# Patient Record
Sex: FEMALE | Race: WHITE | NOT HISPANIC OR LATINO | Employment: UNEMPLOYED | URBAN - METROPOLITAN AREA
[De-identification: names, ages, dates, MRNs, and addresses within clinical notes are randomized per-mention and may not be internally consistent; named-entity substitution may affect disease eponyms.]

---

## 2017-09-07 DIAGNOSIS — R94.31 EKG ABNORMALITIES: ICD-10-CM

## 2017-09-07 LAB — EKG IMPRESSION: NORMAL

## 2019-06-20 ENCOUNTER — HOSPITAL ENCOUNTER (OUTPATIENT)
Dept: RADIOLOGY | Facility: MEDICAL CENTER | Age: 34
End: 2019-06-20

## 2019-06-20 ENCOUNTER — ANESTHESIA (OUTPATIENT)
Dept: SURGERY | Facility: MEDICAL CENTER | Age: 34
End: 2019-06-20
Payer: COMMERCIAL

## 2019-06-20 ENCOUNTER — APPOINTMENT (OUTPATIENT)
Dept: RADIOLOGY | Facility: MEDICAL CENTER | Age: 34
End: 2019-06-20
Attending: EMERGENCY MEDICINE
Payer: COMMERCIAL

## 2019-06-20 ENCOUNTER — APPOINTMENT (OUTPATIENT)
Dept: RADIOLOGY | Facility: MEDICAL CENTER | Age: 34
End: 2019-06-20
Attending: HOSPITALIST
Payer: COMMERCIAL

## 2019-06-20 ENCOUNTER — HOSPITAL ENCOUNTER (OUTPATIENT)
Facility: MEDICAL CENTER | Age: 34
End: 2019-06-22
Attending: EMERGENCY MEDICINE | Admitting: HOSPITALIST
Payer: COMMERCIAL

## 2019-06-20 ENCOUNTER — ANESTHESIA EVENT (OUTPATIENT)
Dept: SURGERY | Facility: MEDICAL CENTER | Age: 34
End: 2019-06-20
Payer: COMMERCIAL

## 2019-06-20 DIAGNOSIS — R79.89 ELEVATED LFTS: ICD-10-CM

## 2019-06-20 DIAGNOSIS — K92.1 MELENA: ICD-10-CM

## 2019-06-20 DIAGNOSIS — K81.0 CHOLECYSTITIS, ACUTE: ICD-10-CM

## 2019-06-20 DIAGNOSIS — D64.9 ANEMIA, UNSPECIFIED TYPE: ICD-10-CM

## 2019-06-20 DIAGNOSIS — R10.10 PAIN OF UPPER ABDOMEN: ICD-10-CM

## 2019-06-20 PROBLEM — R10.9 AP (ABDOMINAL PAIN): Status: ACTIVE | Noted: 2019-06-20

## 2019-06-20 PROBLEM — Z72.0 TOBACCO ABUSE: Status: ACTIVE | Noted: 2019-06-20

## 2019-06-20 PROBLEM — R74.01 TRANSAMINITIS: Status: ACTIVE | Noted: 2019-06-20

## 2019-06-20 LAB
ALBUMIN SERPL BCP-MCNC: 3.9 G/DL (ref 3.2–4.9)
ALBUMIN/GLOB SERPL: 1.4 G/DL
ALP SERPL-CCNC: 111 U/L (ref 30–99)
ALT SERPL-CCNC: 146 U/L (ref 2–50)
ANION GAP SERPL CALC-SCNC: 6 MMOL/L (ref 0–11.9)
AST SERPL-CCNC: 195 U/L (ref 12–45)
BASOPHILS # BLD AUTO: 0.2 % (ref 0–1.8)
BASOPHILS # BLD: 0.01 K/UL (ref 0–0.12)
BILIRUB SERPL-MCNC: 0.4 MG/DL (ref 0.1–1.5)
BUN SERPL-MCNC: 8 MG/DL (ref 8–22)
CALCIUM SERPL-MCNC: 8.9 MG/DL (ref 8.5–10.5)
CHLORIDE SERPL-SCNC: 108 MMOL/L (ref 96–112)
CO2 SERPL-SCNC: 25 MMOL/L (ref 20–33)
COMMENT 1642: NORMAL
CREAT SERPL-MCNC: 0.89 MG/DL (ref 0.5–1.4)
EOSINOPHIL # BLD AUTO: 0.11 K/UL (ref 0–0.51)
EOSINOPHIL NFR BLD: 2.6 % (ref 0–6.9)
ERYTHROCYTE [DISTWIDTH] IN BLOOD BY AUTOMATED COUNT: 44.1 FL (ref 35.9–50)
GLOBULIN SER CALC-MCNC: 2.8 G/DL (ref 1.9–3.5)
GLUCOSE SERPL-MCNC: 110 MG/DL (ref 65–99)
HAV IGM SERPL QL IA: NEGATIVE
HBV CORE IGM SER QL: NEGATIVE
HBV SURFACE AG SER QL: NEGATIVE
HCG SERPL QL: NEGATIVE
HCG UR QL: NEGATIVE
HCT VFR BLD AUTO: 35.7 % (ref 37–47)
HCV AB SER QL: NEGATIVE
HETEROPH AB SER QL: NEGATIVE
HGB BLD-MCNC: 11.5 G/DL (ref 12–16)
IMM GRANULOCYTES # BLD AUTO: 0.01 K/UL (ref 0–0.11)
IMM GRANULOCYTES NFR BLD AUTO: 0.2 % (ref 0–0.9)
LIPASE SERPL-CCNC: 12 U/L (ref 11–82)
LYMPHOCYTES # BLD AUTO: 1.43 K/UL (ref 1–4.8)
LYMPHOCYTES NFR BLD: 34 % (ref 22–41)
MCH RBC QN AUTO: 29.5 PG (ref 27–33)
MCHC RBC AUTO-ENTMCNC: 32.2 G/DL (ref 33.6–35)
MCV RBC AUTO: 91.5 FL (ref 81.4–97.8)
MONOCYTES # BLD AUTO: 0.3 K/UL (ref 0–0.85)
MONOCYTES NFR BLD AUTO: 7.1 % (ref 0–13.4)
MORPHOLOGY BLD-IMP: NORMAL
NEUTROPHILS # BLD AUTO: 2.34 K/UL (ref 2–7.15)
NEUTROPHILS NFR BLD: 55.9 % (ref 44–72)
NRBC # BLD AUTO: 0 K/UL
NRBC BLD-RTO: 0 /100 WBC
PLATELET # BLD AUTO: 236 K/UL (ref 164–446)
PLATELET BLD QL SMEAR: NORMAL
PMV BLD AUTO: 11.1 FL (ref 9–12.9)
POTASSIUM SERPL-SCNC: 3.7 MMOL/L (ref 3.6–5.5)
PROT SERPL-MCNC: 6.7 G/DL (ref 6–8.2)
RBC # BLD AUTO: 3.9 M/UL (ref 4.2–5.4)
RBC BLD AUTO: NORMAL
SODIUM SERPL-SCNC: 139 MMOL/L (ref 135–145)
SP GR UR REFRACTOMETRY: 1.02
WBC # BLD AUTO: 4.2 K/UL (ref 4.8–10.8)

## 2019-06-20 PROCEDURE — 500002 HCHG ADHESIVE, DERMABOND: Performed by: SURGERY

## 2019-06-20 PROCEDURE — 84703 CHORIONIC GONADOTROPIN ASSAY: CPT

## 2019-06-20 PROCEDURE — 700111 HCHG RX REV CODE 636 W/ 250 OVERRIDE (IP): Performed by: HOSPITALIST

## 2019-06-20 PROCEDURE — 503366 HCHG TROCAR, 12X150 KII FIOS Z THR: Performed by: SURGERY

## 2019-06-20 PROCEDURE — 85025 COMPLETE CBC W/AUTO DIFF WBC: CPT

## 2019-06-20 PROCEDURE — A9270 NON-COVERED ITEM OR SERVICE: HCPCS | Performed by: STUDENT IN AN ORGANIZED HEALTH CARE EDUCATION/TRAINING PROGRAM

## 2019-06-20 PROCEDURE — 700111 HCHG RX REV CODE 636 W/ 250 OVERRIDE (IP): Performed by: STUDENT IN AN ORGANIZED HEALTH CARE EDUCATION/TRAINING PROGRAM

## 2019-06-20 PROCEDURE — 80053 COMPREHEN METABOLIC PANEL: CPT

## 2019-06-20 PROCEDURE — 700101 HCHG RX REV CODE 250: Performed by: STUDENT IN AN ORGANIZED HEALTH CARE EDUCATION/TRAINING PROGRAM

## 2019-06-20 PROCEDURE — 700102 HCHG RX REV CODE 250 W/ 637 OVERRIDE(OP): Performed by: HOSPITALIST

## 2019-06-20 PROCEDURE — 80074 ACUTE HEPATITIS PANEL: CPT

## 2019-06-20 PROCEDURE — 160036 HCHG PACU - EA ADDL 30 MINS PHASE I: Performed by: SURGERY

## 2019-06-20 PROCEDURE — 160048 HCHG OR STATISTICAL LEVEL 1-5: Performed by: SURGERY

## 2019-06-20 PROCEDURE — 700105 HCHG RX REV CODE 258: Performed by: STUDENT IN AN ORGANIZED HEALTH CARE EDUCATION/TRAINING PROGRAM

## 2019-06-20 PROCEDURE — 160002 HCHG RECOVERY MINUTES (STAT): Performed by: SURGERY

## 2019-06-20 PROCEDURE — 86308 HETEROPHILE ANTIBODY SCREEN: CPT

## 2019-06-20 PROCEDURE — 502240 HCHG MISC OR SUPPLY RC 0272: Performed by: SURGERY

## 2019-06-20 PROCEDURE — 87529 HSV DNA AMP PROBE: CPT

## 2019-06-20 PROCEDURE — 88304 TISSUE EXAM BY PATHOLOGIST: CPT

## 2019-06-20 PROCEDURE — 160028 HCHG SURGERY MINUTES - 1ST 30 MINS LEVEL 3: Performed by: SURGERY

## 2019-06-20 PROCEDURE — 700117 HCHG RX CONTRAST REV CODE 255: Performed by: EMERGENCY MEDICINE

## 2019-06-20 PROCEDURE — 99285 EMERGENCY DEPT VISIT HI MDM: CPT

## 2019-06-20 PROCEDURE — 160039 HCHG SURGERY MINUTES - EA ADDL 1 MIN LEVEL 3: Performed by: SURGERY

## 2019-06-20 PROCEDURE — 99406 BEHAV CHNG SMOKING 3-10 MIN: CPT | Performed by: HOSPITALIST

## 2019-06-20 PROCEDURE — 83690 ASSAY OF LIPASE: CPT

## 2019-06-20 PROCEDURE — 502571 HCHG PACK, LAP CHOLE: Performed by: SURGERY

## 2019-06-20 PROCEDURE — 96374 THER/PROPH/DIAG INJ IV PUSH: CPT

## 2019-06-20 PROCEDURE — 99358 PROLONG SERVICE W/O CONTACT: CPT | Mod: 25 | Performed by: HOSPITALIST

## 2019-06-20 PROCEDURE — 700105 HCHG RX REV CODE 258: Performed by: HOSPITALIST

## 2019-06-20 PROCEDURE — 501838 HCHG SUTURE GENERAL: Performed by: SURGERY

## 2019-06-20 PROCEDURE — 700102 HCHG RX REV CODE 250 W/ 637 OVERRIDE(OP): Performed by: STUDENT IN AN ORGANIZED HEALTH CARE EDUCATION/TRAINING PROGRAM

## 2019-06-20 PROCEDURE — 160009 HCHG ANES TIME/MIN: Performed by: SURGERY

## 2019-06-20 PROCEDURE — G0378 HOSPITAL OBSERVATION PER HR: HCPCS

## 2019-06-20 PROCEDURE — A9270 NON-COVERED ITEM OR SERVICE: HCPCS | Performed by: HOSPITALIST

## 2019-06-20 PROCEDURE — 74177 CT ABD & PELVIS W/CONTRAST: CPT

## 2019-06-20 PROCEDURE — 81025 URINE PREGNANCY TEST: CPT

## 2019-06-20 PROCEDURE — 160035 HCHG PACU - 1ST 60 MINS PHASE I: Performed by: SURGERY

## 2019-06-20 PROCEDURE — 99223 1ST HOSP IP/OBS HIGH 75: CPT | Mod: 25 | Performed by: HOSPITALIST

## 2019-06-20 PROCEDURE — 76705 ECHO EXAM OF ABDOMEN: CPT

## 2019-06-20 PROCEDURE — A9537 TC99M MEBROFENIN: HCPCS

## 2019-06-20 RX ORDER — CEFAZOLIN SODIUM 1 G/3ML
INJECTION, POWDER, FOR SOLUTION INTRAMUSCULAR; INTRAVENOUS PRN
Status: DISCONTINUED | OUTPATIENT
Start: 2019-06-20 | End: 2019-06-20 | Stop reason: SURG

## 2019-06-20 RX ORDER — HYDROMORPHONE HYDROCHLORIDE 1 MG/ML
0.1 INJECTION, SOLUTION INTRAMUSCULAR; INTRAVENOUS; SUBCUTANEOUS
Status: DISCONTINUED | OUTPATIENT
Start: 2019-06-20 | End: 2019-06-20 | Stop reason: HOSPADM

## 2019-06-20 RX ORDER — OMEPRAZOLE 20 MG/1
20 CAPSULE, DELAYED RELEASE ORAL 2 TIMES DAILY
Status: DISCONTINUED | OUTPATIENT
Start: 2019-06-20 | End: 2019-06-22 | Stop reason: HOSPADM

## 2019-06-20 RX ORDER — MORPHINE SULFATE 4 MG/ML
2 INJECTION, SOLUTION INTRAMUSCULAR; INTRAVENOUS
Status: DISCONTINUED | OUTPATIENT
Start: 2019-06-20 | End: 2019-06-22 | Stop reason: HOSPADM

## 2019-06-20 RX ORDER — NICOTINE 21 MG/24HR
14 PATCH, TRANSDERMAL 24 HOURS TRANSDERMAL
Status: DISCONTINUED | OUTPATIENT
Start: 2019-06-20 | End: 2019-06-22 | Stop reason: HOSPADM

## 2019-06-20 RX ORDER — OXYCODONE HYDROCHLORIDE 5 MG/1
2.5 TABLET ORAL
Status: DISCONTINUED | OUTPATIENT
Start: 2019-06-20 | End: 2019-06-22 | Stop reason: HOSPADM

## 2019-06-20 RX ORDER — ONDANSETRON 2 MG/ML
INJECTION INTRAMUSCULAR; INTRAVENOUS PRN
Status: DISCONTINUED | OUTPATIENT
Start: 2019-06-20 | End: 2019-06-20 | Stop reason: SURG

## 2019-06-20 RX ORDER — ONDANSETRON 2 MG/ML
4 INJECTION INTRAMUSCULAR; INTRAVENOUS
Status: DISCONTINUED | OUTPATIENT
Start: 2019-06-20 | End: 2019-06-20 | Stop reason: HOSPADM

## 2019-06-20 RX ORDER — OXYCODONE HCL 5 MG/5 ML
5 SOLUTION, ORAL ORAL
Status: COMPLETED | OUTPATIENT
Start: 2019-06-20 | End: 2019-06-20

## 2019-06-20 RX ORDER — HYDROMORPHONE HYDROCHLORIDE 1 MG/ML
0.4 INJECTION, SOLUTION INTRAMUSCULAR; INTRAVENOUS; SUBCUTANEOUS
Status: DISCONTINUED | OUTPATIENT
Start: 2019-06-20 | End: 2019-06-20 | Stop reason: HOSPADM

## 2019-06-20 RX ORDER — PROMETHAZINE HYDROCHLORIDE 25 MG/1
12.5-25 SUPPOSITORY RECTAL EVERY 4 HOURS PRN
Status: DISCONTINUED | OUTPATIENT
Start: 2019-06-20 | End: 2019-06-22 | Stop reason: HOSPADM

## 2019-06-20 RX ORDER — GABAPENTIN 100 MG/1
200 CAPSULE ORAL 3 TIMES DAILY
COMMUNITY

## 2019-06-20 RX ORDER — ONDANSETRON 4 MG/1
4 TABLET, ORALLY DISINTEGRATING ORAL EVERY 4 HOURS PRN
Status: DISCONTINUED | OUTPATIENT
Start: 2019-06-20 | End: 2019-06-22 | Stop reason: HOSPADM

## 2019-06-20 RX ORDER — HALOPERIDOL 5 MG/ML
1 INJECTION INTRAMUSCULAR
Status: DISCONTINUED | OUTPATIENT
Start: 2019-06-20 | End: 2019-06-20 | Stop reason: HOSPADM

## 2019-06-20 RX ORDER — AMOXICILLIN 250 MG
2 CAPSULE ORAL 2 TIMES DAILY
Status: DISCONTINUED | OUTPATIENT
Start: 2019-06-20 | End: 2019-06-22 | Stop reason: HOSPADM

## 2019-06-20 RX ORDER — HYDROMORPHONE HYDROCHLORIDE 1 MG/ML
0.2 INJECTION, SOLUTION INTRAMUSCULAR; INTRAVENOUS; SUBCUTANEOUS
Status: DISCONTINUED | OUTPATIENT
Start: 2019-06-20 | End: 2019-06-20 | Stop reason: HOSPADM

## 2019-06-20 RX ORDER — OXYCODONE HYDROCHLORIDE 5 MG/1
5 TABLET ORAL
Status: DISCONTINUED | OUTPATIENT
Start: 2019-06-20 | End: 2019-06-22 | Stop reason: HOSPADM

## 2019-06-20 RX ORDER — MEPERIDINE HYDROCHLORIDE 25 MG/ML
12.5 INJECTION INTRAMUSCULAR; INTRAVENOUS; SUBCUTANEOUS
Status: DISCONTINUED | OUTPATIENT
Start: 2019-06-20 | End: 2019-06-20 | Stop reason: HOSPADM

## 2019-06-20 RX ORDER — SODIUM CHLORIDE, SODIUM LACTATE, POTASSIUM CHLORIDE, CALCIUM CHLORIDE 600; 310; 30; 20 MG/100ML; MG/100ML; MG/100ML; MG/100ML
INJECTION, SOLUTION INTRAVENOUS CONTINUOUS
Status: DISCONTINUED | OUTPATIENT
Start: 2019-06-20 | End: 2019-06-20 | Stop reason: HOSPADM

## 2019-06-20 RX ORDER — PROMETHAZINE HYDROCHLORIDE 25 MG/1
12.5-25 TABLET ORAL EVERY 4 HOURS PRN
Status: DISCONTINUED | OUTPATIENT
Start: 2019-06-20 | End: 2019-06-22 | Stop reason: HOSPADM

## 2019-06-20 RX ORDER — POLYETHYLENE GLYCOL 3350 17 G/17G
1 POWDER, FOR SOLUTION ORAL
Status: DISCONTINUED | OUTPATIENT
Start: 2019-06-20 | End: 2019-06-22 | Stop reason: HOSPADM

## 2019-06-20 RX ORDER — BUPIVACAINE HYDROCHLORIDE AND EPINEPHRINE 5; 5 MG/ML; UG/ML
INJECTION, SOLUTION EPIDURAL; INTRACAUDAL; PERINEURAL
Status: DISCONTINUED | OUTPATIENT
Start: 2019-06-20 | End: 2019-06-20 | Stop reason: HOSPADM

## 2019-06-20 RX ORDER — OXYCODONE HCL 5 MG/5 ML
10 SOLUTION, ORAL ORAL
Status: COMPLETED | OUTPATIENT
Start: 2019-06-20 | End: 2019-06-20

## 2019-06-20 RX ORDER — ONDANSETRON 2 MG/ML
4 INJECTION INTRAMUSCULAR; INTRAVENOUS EVERY 4 HOURS PRN
Status: DISCONTINUED | OUTPATIENT
Start: 2019-06-20 | End: 2019-06-22 | Stop reason: HOSPADM

## 2019-06-20 RX ORDER — DIPHENHYDRAMINE HYDROCHLORIDE 50 MG/ML
12.5 INJECTION INTRAMUSCULAR; INTRAVENOUS
Status: DISCONTINUED | OUTPATIENT
Start: 2019-06-20 | End: 2019-06-20 | Stop reason: HOSPADM

## 2019-06-20 RX ORDER — BISACODYL 10 MG
10 SUPPOSITORY, RECTAL RECTAL
Status: DISCONTINUED | OUTPATIENT
Start: 2019-06-20 | End: 2019-06-22 | Stop reason: HOSPADM

## 2019-06-20 RX ORDER — SODIUM CHLORIDE, SODIUM LACTATE, POTASSIUM CHLORIDE, CALCIUM CHLORIDE 600; 310; 30; 20 MG/100ML; MG/100ML; MG/100ML; MG/100ML
INJECTION, SOLUTION INTRAVENOUS
Status: DISCONTINUED | OUTPATIENT
Start: 2019-06-20 | End: 2019-06-20 | Stop reason: SURG

## 2019-06-20 RX ORDER — SODIUM CHLORIDE, SODIUM LACTATE, POTASSIUM CHLORIDE, CALCIUM CHLORIDE 600; 310; 30; 20 MG/100ML; MG/100ML; MG/100ML; MG/100ML
1000 INJECTION, SOLUTION INTRAVENOUS ONCE
Status: COMPLETED | OUTPATIENT
Start: 2019-06-20 | End: 2019-06-20

## 2019-06-20 RX ADMIN — FENTANYL CITRATE 50 MCG: 50 INJECTION INTRAMUSCULAR; INTRAVENOUS at 19:22

## 2019-06-20 RX ADMIN — FENTANYL CITRATE 50 MCG: 50 INJECTION, SOLUTION INTRAMUSCULAR; INTRAVENOUS at 18:36

## 2019-06-20 RX ADMIN — SODIUM CHLORIDE, POTASSIUM CHLORIDE, SODIUM LACTATE AND CALCIUM CHLORIDE 1000 ML: 600; 310; 30; 20 INJECTION, SOLUTION INTRAVENOUS at 16:36

## 2019-06-20 RX ADMIN — FENTANYL CITRATE 100 MCG: 50 INJECTION, SOLUTION INTRAMUSCULAR; INTRAVENOUS at 18:12

## 2019-06-20 RX ADMIN — EPHEDRINE SULFATE 10 MG: 50 INJECTION INTRAMUSCULAR; INTRAVENOUS; SUBCUTANEOUS at 18:23

## 2019-06-20 RX ADMIN — PROPOFOL 200 MG: 10 INJECTION, EMULSION INTRAVENOUS at 18:17

## 2019-06-20 RX ADMIN — LIDOCAINE HYDROCHLORIDE 50 MG: 20 INJECTION, SOLUTION INTRAVENOUS at 18:35

## 2019-06-20 RX ADMIN — SUGAMMADEX 200 MG: 100 INJECTION, SOLUTION INTRAVENOUS at 18:50

## 2019-06-20 RX ADMIN — SODIUM CHLORIDE, POTASSIUM CHLORIDE, SODIUM LACTATE AND CALCIUM CHLORIDE: 600; 310; 30; 20 INJECTION, SOLUTION INTRAVENOUS at 18:11

## 2019-06-20 RX ADMIN — FENTANYL CITRATE 50 MCG: 50 INJECTION INTRAMUSCULAR; INTRAVENOUS at 19:17

## 2019-06-20 RX ADMIN — CEFAZOLIN 3 G: 330 INJECTION, POWDER, FOR SOLUTION INTRAMUSCULAR; INTRAVENOUS at 18:23

## 2019-06-20 RX ADMIN — SUCCINYLCHOLINE CHLORIDE 160 MG: 20 INJECTION, SOLUTION INTRAMUSCULAR; INTRAVENOUS at 18:17

## 2019-06-20 RX ADMIN — LIDOCAINE HYDROCHLORIDE 100 MG: 20 INJECTION, SOLUTION INTRAVENOUS at 18:17

## 2019-06-20 RX ADMIN — OXYCODONE HYDROCHLORIDE 5 MG: 5 TABLET ORAL at 23:31

## 2019-06-20 RX ADMIN — ROCURONIUM BROMIDE 50 MG: 10 INJECTION, SOLUTION INTRAVENOUS at 18:22

## 2019-06-20 RX ADMIN — MORPHINE SULFATE 2 MG: 4 INJECTION INTRAVENOUS at 08:15

## 2019-06-20 RX ADMIN — IOHEXOL 100 ML: 350 INJECTION, SOLUTION INTRAVENOUS at 05:21

## 2019-06-20 RX ADMIN — OMEPRAZOLE 20 MG: 20 CAPSULE, DELAYED RELEASE ORAL at 16:35

## 2019-06-20 RX ADMIN — OXYCODONE HYDROCHLORIDE 10 MG: 5 SOLUTION ORAL at 19:26

## 2019-06-20 RX ADMIN — LIDOCAINE HYDROCHLORIDE 50 MG: 20 INJECTION, SOLUTION INTRAVENOUS at 19:00

## 2019-06-20 RX ADMIN — LIDOCAINE HYDROCHLORIDE 50 MG: 20 INJECTION, SOLUTION INTRAVENOUS at 18:53

## 2019-06-20 RX ADMIN — ONDANSETRON 4 MG: 2 INJECTION INTRAMUSCULAR; INTRAVENOUS at 18:50

## 2019-06-20 RX ADMIN — OXYCODONE HYDROCHLORIDE 5 MG: 5 TABLET ORAL at 16:36

## 2019-06-20 ASSESSMENT — ENCOUNTER SYMPTOMS
SHORTNESS OF BREATH: 0
MYALGIAS: 0
VOMITING: 1
FEVER: 0
PALPITATIONS: 0
PHOTOPHOBIA: 0
DIARRHEA: 0
ABDOMINAL PAIN: 1
HEADACHES: 0
WHEEZING: 0
TINGLING: 0
SORE THROAT: 0
FOCAL WEAKNESS: 0
DIZZINESS: 0
NAUSEA: 1
CHILLS: 0
COUGH: 0
DEPRESSION: 0

## 2019-06-20 ASSESSMENT — PATIENT HEALTH QUESTIONNAIRE - PHQ9
1. LITTLE INTEREST OR PLEASURE IN DOING THINGS: NOT AT ALL
2. FEELING DOWN, DEPRESSED, IRRITABLE, OR HOPELESS: NOT AT ALL
SUM OF ALL RESPONSES TO PHQ9 QUESTIONS 1 AND 2: 0

## 2019-06-20 ASSESSMENT — LIFESTYLE VARIABLES
EVER_SMOKED: YES
ALCOHOL_USE: NO

## 2019-06-20 NOTE — PROGRESS NOTES
Patient seen and examined, admitted by my partner earlier today.  HIDA scan pending, pain controlled with current interventions.      Addendum:  HIDA shows biliary dyskinesia without evidence of acute cholecystitis.  I called Dr Otilio Nuno and he will consult today and likely perform lap cholecystectomy.

## 2019-06-20 NOTE — ED NOTES
Patient resting comfortably. Chest rise and fall noted. Call light within reach. Will Continue to monitor.

## 2019-06-20 NOTE — ED TRIAGE NOTES
Chief Complaint   Patient presents with   • N/V   • Sent by MD     transfer from [place] for GI bleed for GI consult     Pt BIB EMS for above complaint. Pt received pepcid, GI cocktail, 4 zofran and 1L NS at previous facility. Pt's US was negative for gallbladder issues at previous facility. Pt received 2 morphine and 500 ml NS en route to this facility. Pt was sent here for a GI consult due to lack of facilities back home.

## 2019-06-20 NOTE — ED NOTES
Report received, care of pt taken over, pt c/o of pain, NPO for HIDA scan, will hold off on pain meds until after HIDA scan.

## 2019-06-20 NOTE — ED PROVIDER NOTES
"ED Provider Note      Patient CARE signed out from night ERP.  Patient was  Transfer from [place] with history of nausea vomiting, abdominal pain and elevated LFTs with black stool and passage of blood clots per rectum.  Patient was thought to have a GI bleed.  No Ct available at the [facility], she is was sent here for further imaging per report, patient was trace guaiac positive at the [facility].  She also had an ultrasound a few days ago.  She had denied, alcohol abuse.  At this time, patient is awaiting CT evaluation.  There was some difficulty in establishing an IV.  Patient was treated with NS, Pepcid and a GI cocktail, prior to arrival.  Patient was seen in the [facility], twice for the same symptoms, prior to transfer.    4:38 AM ultrasound report reviewed, this is from June 17.  \"Unremarkable ultrasound appearance of the gallbladder and kidneys.  Hepatomegaly\".  Gallbladder within normal limits.  No note of ductal dilatation or stones.  H. pylori testing at the [facility] negative.  Hemoglobin noted to be 13 at approximately 10 PM yesterday.  Now 11.5.  Previously amylase and lipase normal.      Imaging    CT-ABDOMEN-PELVIS WITH   Final Result         1.  No acute abnormality.   2.  Low-density left hepatic lobe lesion, most commonly associated with cyst or hemangioma. Consider other hepatic lesions additional workup as clinically appropriate.   3.  Hepatomegaly      US-RUQ   Final Result         1.  Hepatomegaly and echogenic liver, compatible with fatty change and/or fibrosis.      OUTSIDE IMAGES-US ABDOMEN   Final Result      OUTSIDE IMAGES-DX ABDOMEN   Final Result        5:37 AM, patient's reevaluated the bedside.  She is resting but awakens as I enter the room.  She does complain of persistent epigastric and right upper quadrant pain.  We discussed lab results from the [facility] and results here in the ED as well as ultrasound and CT findings.  At this time, I have " advised admission to the hospital for likely need further evaluation with a HIDA scan.  He is agreeable to this plan of care.    5:42 AM, discussed with Dr. Shook, hospitalist, who agrees to admit the patient to their service.  She will order the HIDA scan.    Impression:  1. Gastrointestinal hemorrhage, unspecified gastrointestinal hemorrhage type    2. Elevated LFTs    3. Pain of upper abdomen    4. Anemia, unspecified type

## 2019-06-20 NOTE — ED PROVIDER NOTES
"ED Provider Note    CHIEF COMPLAINT  Chief Complaint   Patient presents with   • N/V   • Sent by MD     transfer from [place] for GI bleed for GI consult       HPI  Jasmine Saldivar is a 34 y.o. female here for evaluation of possible gi bleeding. The pt states the she has been having some intermittent right upper quad and epigastric pain over the last few days.  She states the pain stays in the epigastrium, and does not radiate to the back. She has no cp, no sob, and she has not taken anything for the pain.  She states that she had an episode of 'dark stool' earlier today, which prompted her to come in, from [place] (they flew her here). Pt is not on blood thinners.     PAST MEDICAL HISTORY   no bleeding disorders     SOCIAL HISTORY  Social History     Social History Main Topics   • Smoking status: Current Every Day Smoker     Packs/day: 0.50     Types: Cigarettes   • Smokeless tobacco: Never Used   • Alcohol use Yes      Comment: rarely   • Drug use: No   • Sexual activity: Not on file       SURGICAL HISTORY  patient denies any surgical history    CURRENT MEDICATIONS  Home Medications    **Home medications have not yet been reviewed for this encounter**         ALLERGIES  No Known Allergies    REVIEW OF SYSTEMS  See HPI for further details. Review of systems as above, otherwise all other systems are negative.     PHYSICAL EXAM  VITAL SIGNS: /83   Pulse 72   Temp 36.1 °C (97 °F) (Temporal)   Resp 18   Ht 1.651 m (5' 5\")   Wt (!) 132.5 kg (292 lb)   BMI 48.59 kg/m²     Constitutional: Well developed, well nourished. No acute distress.  HEENT: Normocephalic, atraumatic. MMM  Neck: Supple, Full range of motion   Chest/Pulmonary:  No respiratory distress.  Equal expansion   Abd:  Soft, mild diffuse tenderness. No guarding, no P/S.  Musculoskeletal: No deformity, no edema, neurovascular intact.   Neuro: Clear speech, appropriate, cooperative, cranial nerves II-XII grossly intact.  Psych: Normal mood " and affect    Results for orders placed or performed during the hospital encounter of 06/20/19   CBC WITH DIFFERENTIAL   Result Value Ref Range    WBC 4.2 (L) 4.8 - 10.8 K/uL    RBC 3.90 (L) 4.20 - 5.40 M/uL    Hemoglobin 11.5 (L) 12.0 - 16.0 g/dL    Hematocrit 35.7 (L) 37.0 - 47.0 %    MCV 91.5 81.4 - 97.8 fL    MCH 29.5 27.0 - 33.0 pg    MCHC 32.2 (L) 33.6 - 35.0 g/dL    RDW 44.1 35.9 - 50.0 fL    Platelet Count 236 164 - 446 K/uL    MPV 11.1 9.0 - 12.9 fL    Neutrophils-Polys 55.90 44.00 - 72.00 %    Lymphocytes 34.00 22.00 - 41.00 %    Monocytes 7.10 0.00 - 13.40 %    Eosinophils 2.60 0.00 - 6.90 %    Basophils 0.20 0.00 - 1.80 %    Immature Granulocytes 0.20 0.00 - 0.90 %    Nucleated RBC 0.00 /100 WBC    Neutrophils (Absolute) 2.34 2.00 - 7.15 K/uL    Lymphs (Absolute) 1.43 1.00 - 4.80 K/uL    Monos (Absolute) 0.30 0.00 - 0.85 K/uL    Eos (Absolute) 0.11 0.00 - 0.51 K/uL    Baso (Absolute) 0.01 0.00 - 0.12 K/uL    Immature Granulocytes (abs) 0.01 0.00 - 0.11 K/uL    NRBC (Absolute) 0.00 K/uL   COMP METABOLIC PANEL   Result Value Ref Range    Sodium 139 135 - 145 mmol/L    Potassium 3.7 3.6 - 5.5 mmol/L    Chloride 108 96 - 112 mmol/L    Co2 25 20 - 33 mmol/L    Anion Gap 6.0 0.0 - 11.9    Glucose 110 (H) 65 - 99 mg/dL    Bun 8 8 - 22 mg/dL    Creatinine 0.89 0.50 - 1.40 mg/dL    Calcium 8.9 8.5 - 10.5 mg/dL    AST(SGOT) 195 (H) 12 - 45 U/L    ALT(SGPT) 146 (H) 2 - 50 U/L    Alkaline Phosphatase 111 (H) 30 - 99 U/L    Total Bilirubin 0.4 0.1 - 1.5 mg/dL    Albumin 3.9 3.2 - 4.9 g/dL    Total Protein 6.7 6.0 - 8.2 g/dL    Globulin 2.8 1.9 - 3.5 g/dL    A-G Ratio 1.4 g/dL   BETA-HCG QUALITATIVE SERUM   Result Value Ref Range    Beta-Hcg Qualitative Serum Negative Negative   LIPASE   Result Value Ref Range    Lipase 12 11 - 82 U/L   ESTIMATED GFR   Result Value Ref Range    GFR If African American >60 >60 mL/min/1.73 m 2    GFR If Non African American >60 >60 mL/min/1.73 m 2   PERIPHERAL SMEAR REVIEW   Result  Value Ref Range    Peripheral Smear Review see below    PLATELET ESTIMATE   Result Value Ref Range    Plt Estimation Normal    MORPHOLOGY   Result Value Ref Range    RBC Morphology Normal    DIFFERENTIAL COMMENT   Result Value Ref Range    Comments-Diff see below      CT-ABDOMEN-PELVIS WITH    (Results Pending)     PROCEDURES     MEDICAL RECORD  I have reviewed patient's medical record and pertinent results are listed above.    COURSE & MEDICAL DECISION MAKING  I have reviewed any medical record information, laboratory studies and radiographic results as noted above.    4:10 AM  Dr. Oleary will follow up on the ct scan and likely admit the pt to the hospitalist, for gi consult.     FINAL IMPRESSION  Abdominal pain  Gi bleed      Electronically signed by: Rosas Miller, 6/20/2019 3:13 AM

## 2019-06-20 NOTE — H&P
Hospital Medicine History & Physical Note    Date of Service  6/20/2019    Primary Care Physician  No primary care provider on file.    Consultants  None    Code Status  Full    Chief Complaint  Chief Complaint   Patient presents with   • N/V   • Sent by MD     transfer from [place] for GI bleed for GI consult       History of Presenting Illness  34 y.o. female who presented on 6/20/2019 in transfer from outside facility for melena.  This is a 34-year-old female with no significant medical problems who presented to an outside facility today with complaints of worsening abdominal pain.  She was seen 2 days prior at the same facility with similar complaints in addition to gassiness.  She had a thorough work-up including an ultrasound and H. pylori testing both of which was negative.  She was given a prescription for a proton pump inhibitor however she was unable to fill this.  Today's, she returned to the outside facility with complaints of persistent abdominal pain in the right upper quadrant which is exquisitely tender, has no alleviating factors, and is aggravated with touch.  She also states that she had multiple episodes of dark stool.  She denies any recent foreign travel, no dietary changes, no prescription medication changes, no over-the-counter medications or herbal medications.  She has not been on any picnics, camping, or hiking.  She does admit to taking 600 mg of ibuprofen nightly for the last several weeks because it had been prescribed to her by her doctor.  She denies otherwise any hemoptysis or hematemesis.    Review of Systems  Review of Systems   Constitutional: Negative for chills and fever.   HENT: Negative for congestion and sore throat.    Eyes: Negative for photophobia.   Respiratory: Negative for cough, shortness of breath and wheezing.    Cardiovascular: Negative for chest pain and palpitations.   Gastrointestinal: Positive for abdominal pain, melena, nausea and vomiting. Negative for  diarrhea.   Genitourinary: Negative for dysuria.   Musculoskeletal: Negative for myalgias.   Skin: Negative.    Neurological: Negative for dizziness, tingling, focal weakness and headaches.   Psychiatric/Behavioral: Negative for depression and suicidal ideas.       Past Medical History  Chronic pain    Surgical History  Tubal ligation    Family History  History reviewed. No pertinent family history.    Social History  Social History   Substance Use Topics   • Smoking status: Current Every Day Smoker     Packs/day: 0.50     Types: Cigarettes   • Smokeless tobacco: Never Used   • Alcohol use Yes      Comment: rarely       Allergies  No Known Allergies    Medications  No current facility-administered medications on file prior to encounter.      No current outpatient prescriptions on file prior to encounter.       Physical Exam  Hemodynamics  Temp (24hrs), Av.1 °C (97 °F), Min:36.1 °C (97 °F), Max:36.1 °C (97 °F)   Temperature: 36.1 °C (97 °F)  Pulse  Av.2  Min: 62  Max: 89 Heart Rate (Monitored): 65  Blood Pressure: 123/83, NIBP: 114/65      Respiratory      Respiration: (!) 26, Pulse Oximetry: 97 %             Physical Exam   Constitutional: She is oriented to person, place, and time. No distress.   HENT:   Head: Normocephalic and atraumatic.   Right Ear: External ear normal.   Left Ear: External ear normal.   Eyes: EOM are normal. Right eye exhibits no discharge. Left eye exhibits no discharge.   Neck: Neck supple. No JVD present.   Cardiovascular: Normal rate, regular rhythm and normal heart sounds.    Pulmonary/Chest: Effort normal and breath sounds normal. No respiratory distress. She exhibits no tenderness.   Abdominal: Soft. Bowel sounds are normal. She exhibits no distension. There is tenderness (Exquisitely tender to even light touch).   Musculoskeletal: Normal range of motion. She exhibits no edema.   Neurological: She is alert and oriented to person, place, and time. No cranial nerve deficit.    Skin: Skin is warm and dry. She is not diaphoretic. No erythema.   Psychiatric: She has a normal mood and affect. Her behavior is normal.   Nursing note and vitals reviewed.    Capillary refill less than 3 seconds, distal pulses intact    Laboratory:  Recent Labs      06/20/19   0254   WBC  4.2*   RBC  3.90*   HEMOGLOBIN  11.5*   HEMATOCRIT  35.7*   MCV  91.5   MCH  29.5   MCHC  32.2*   RDW  44.1   PLATELETCT  236   MPV  11.1     Recent Labs      06/20/19   0254   SODIUM  139   POTASSIUM  3.7   CHLORIDE  108   CO2  25   GLUCOSE  110*   BUN  8   CREATININE  0.89   CALCIUM  8.9     Recent Labs      06/20/19   0254   ALTSGPT  146*   ASTSGOT  195*   ALKPHOSPHAT  111*   TBILIRUBIN  0.4   LIPASE  12   GLUCOSE  110*                 No results found for: TROPONINI    Imaging  Ct-abdomen-pelvis With    Result Date: 6/20/2019 6/20/2019 5:11 AM HISTORY/REASON FOR EXAM: Abd pain, gastroenteritis or colitis suspected; IV contrast only TECHNIQUE/EXAM DESCRIPTION:  CT abdomen and pelvis with contrast. Sequential axial CT images were obtained from lung bases to the proximal femurs after the uneventful administration of 100 cc Omnipaque 350 intravenous contrast. Low dose optimization technique was utilized for this CT exam including automated exposure control and adjustment of the mA and/or kV according to patient size. COMPARISON:  None CT ABDOMEN FINDINGS: Limited views of the lungs appear within physiologic limits. The liver is normal in contour. Hepatomegaly is observed. No intrahepatic biliary ductal dilatation is noted. 10 mm low-density left hepatic lobe lesion is seen. The gallbladder appears within normal limits. The spleen is unremarkable. The pancreas is grossly normal. Bilateral adrenal glands appear within normal limits. The kidneys are unremarkable.  The ureters are normal in caliber along their visualized course. The colon appears within normal limits. The appendix appears within normal limits. The small bowel is  unremarkable. The bony structures are age appropriate. CT PELVIS FINDINGS: The bladder is within normal limits. The uterus and adnexal structures appear within physiologic limits.     1.  No acute abnormality. 2.  Low-density left hepatic lobe lesion, most commonly associated with cyst or hemangioma. Consider other hepatic lesions additional workup as clinically appropriate. 3.  Hepatomegaly    Us-ruq    Result Date: 6/20/2019 6/20/2019 4:26 AM HISTORY/REASON FOR EXAM:  Abnormal Labs; elevated LFTs, N/V Abdominal pain TECHNIQUE/EXAM DESCRIPTION AND NUMBER OF VIEWS:  Real-time sonography of the liver and biliary tree. COMPARISON: June 17, 2019 FINDINGS: The liver is normal in contour. There is no evidence of solid mass lesion. The liver measures 19.24 cm. The liver appears echogenic. The gallbladder is normal. There is no evidence of cholelithiasis.  The gallbladder wall thickness measures 0.26 cm. There is no pericholecystic fluid. The common duct measures 0.41 cm. The visualized pancreas is unremarkable. The visualized aorta is normal in caliber. Intrahepatic IVC is patent. The portal vein is patent with hepatopetal flow. The MPV measures 1.15 cm. The right kidney measures 10.40 cm. There is no ascites.     1.  Hepatomegaly and echogenic liver, compatible with fatty change and/or fibrosis.        Assessment/Plan:  Anticipate that patient will need less than 2 midnights for management of the discussed medical issues.    * Melena   Assessment & Plan    Patient reports that she has been on a daily ibuprofen for the last several weeks.  However, at 600 mg once per night, I do not expect it to cause significant GI issues although it cannot be wholly ruled out.  I will check an occult stool, monitor her closely and place her on IV fluids for volume expansion.  Continue her home PPI and trend hemoglobin levels with plans to transfuse if it drops below 7.  We will consider a GI consultation.  The patient will be kept  n.p.o. for now for her HIDA scan and after this, we can begin to advance her diet slowly.     Transaminitis   Assessment & Plan    Etiology unclear, based on the patient's history there does not appear to be any recent viral infections nevertheless I will check hepatitis screen, Monospot, CMV, and HSV.  CT of the abdomen shows no dilation of common bile duct or other acute intra-abdominal pathology.  Additionally, ultrasound is also unrevealing.  Continue to monitor chemistries.     AP (abdominal pain)   Assessment & Plan    Etiology unclear, the patient does have elevated LFTs which may be contributing, but otherwise all imaging including ultrasound and CT scan of the abdomen have been negative.  The patient will receive symptomatic management for pain, I will also check a HIDA scan to rule out biliary etiology.     Tobacco abuse   Assessment & Plan    This patient continues to smoke cigarettes. 3 minutes were spent counseling the patient in cessation techniques. Patient understands smoking increases risk factors for stroke and death. Patient is open to counseling.  The benefits of stopping were presented and nicotine replacement has been ordered to assist with withdrawal from nicotine during hospitalization and we will continue to encourage cessation and discuss other supportive resources including community groups and prescription medications.         Prophylaxis: Sequential compression devices for DVT prophylaxis, yes PPI indicated, bowel protocol as needed      I spent a total of 35 minutes of non face to face time performing additional research, reviewing medical records from transferring facility, discussing plan of care with other healthcare providers. Start time: 5:00AM. End time: 5:35AM.

## 2019-06-21 LAB
ALBUMIN SERPL BCP-MCNC: 3.8 G/DL (ref 3.2–4.9)
ALBUMIN/GLOB SERPL: 1.5 G/DL
ALP SERPL-CCNC: 100 U/L (ref 30–99)
ALT SERPL-CCNC: 90 U/L (ref 2–50)
ANION GAP SERPL CALC-SCNC: 8 MMOL/L (ref 0–11.9)
AST SERPL-CCNC: 53 U/L (ref 12–45)
BILIRUB SERPL-MCNC: 0.3 MG/DL (ref 0.1–1.5)
BUN SERPL-MCNC: 7 MG/DL (ref 8–22)
CALCIUM SERPL-MCNC: 8.9 MG/DL (ref 8.5–10.5)
CHLORIDE SERPL-SCNC: 104 MMOL/L (ref 96–112)
CO2 SERPL-SCNC: 23 MMOL/L (ref 20–33)
CREAT SERPL-MCNC: 0.68 MG/DL (ref 0.5–1.4)
GLOBULIN SER CALC-MCNC: 2.6 G/DL (ref 1.9–3.5)
GLUCOSE SERPL-MCNC: 156 MG/DL (ref 65–99)
PATHOLOGY CONSULT NOTE: NORMAL
POTASSIUM SERPL-SCNC: 4.2 MMOL/L (ref 3.6–5.5)
PROT SERPL-MCNC: 6.4 G/DL (ref 6–8.2)
SODIUM SERPL-SCNC: 135 MMOL/L (ref 135–145)

## 2019-06-21 PROCEDURE — 96376 TX/PRO/DX INJ SAME DRUG ADON: CPT

## 2019-06-21 PROCEDURE — 36415 COLL VENOUS BLD VENIPUNCTURE: CPT

## 2019-06-21 PROCEDURE — A9270 NON-COVERED ITEM OR SERVICE: HCPCS | Performed by: HOSPITALIST

## 2019-06-21 PROCEDURE — 96375 TX/PRO/DX INJ NEW DRUG ADDON: CPT

## 2019-06-21 PROCEDURE — 99233 SBSQ HOSP IP/OBS HIGH 50: CPT | Performed by: INTERNAL MEDICINE

## 2019-06-21 PROCEDURE — 700102 HCHG RX REV CODE 250 W/ 637 OVERRIDE(OP): Performed by: HOSPITALIST

## 2019-06-21 PROCEDURE — 700111 HCHG RX REV CODE 636 W/ 250 OVERRIDE (IP): Performed by: HOSPITALIST

## 2019-06-21 PROCEDURE — 80053 COMPREHEN METABOLIC PANEL: CPT

## 2019-06-21 PROCEDURE — G0378 HOSPITAL OBSERVATION PER HR: HCPCS

## 2019-06-21 RX ADMIN — MORPHINE SULFATE 2 MG: 4 INJECTION INTRAVENOUS at 18:06

## 2019-06-21 RX ADMIN — OMEPRAZOLE 20 MG: 20 CAPSULE, DELAYED RELEASE ORAL at 05:36

## 2019-06-21 RX ADMIN — ONDANSETRON 4 MG: 2 INJECTION INTRAMUSCULAR; INTRAVENOUS at 08:41

## 2019-06-21 RX ADMIN — OXYCODONE HYDROCHLORIDE 5 MG: 5 TABLET ORAL at 13:15

## 2019-06-21 RX ADMIN — OXYCODONE HYDROCHLORIDE 5 MG: 5 TABLET ORAL at 04:05

## 2019-06-21 RX ADMIN — OXYCODONE HYDROCHLORIDE 5 MG: 5 TABLET ORAL at 16:51

## 2019-06-21 RX ADMIN — OMEPRAZOLE 20 MG: 20 CAPSULE, DELAYED RELEASE ORAL at 16:51

## 2019-06-21 RX ADMIN — SENNOSIDES, DOCUSATE SODIUM 2 TABLET: 50; 8.6 TABLET, FILM COATED ORAL at 16:51

## 2019-06-21 RX ADMIN — OXYCODONE HYDROCHLORIDE 5 MG: 5 TABLET ORAL at 21:10

## 2019-06-21 RX ADMIN — MORPHINE SULFATE 2 MG: 4 INJECTION INTRAVENOUS at 23:11

## 2019-06-21 ASSESSMENT — COGNITIVE AND FUNCTIONAL STATUS - GENERAL
MOVING TO AND FROM BED TO CHAIR: A LITTLE
SUGGESTED CMS G CODE MODIFIER DAILY ACTIVITY: CJ
TOILETING: A LITTLE
SUGGESTED CMS G CODE MODIFIER MOBILITY: CJ
DRESSING REGULAR UPPER BODY CLOTHING: A LITTLE
CLIMB 3 TO 5 STEPS WITH RAILING: A LITTLE
DRESSING REGULAR LOWER BODY CLOTHING: A LITTLE
HELP NEEDED FOR BATHING: A LITTLE
WALKING IN HOSPITAL ROOM: A LITTLE
MOBILITY SCORE: 20
DAILY ACTIVITIY SCORE: 20
STANDING UP FROM CHAIR USING ARMS: A LITTLE

## 2019-06-21 ASSESSMENT — ENCOUNTER SYMPTOMS
EYE PAIN: 0
NAUSEA: 1
FEVER: 0
COUGH: 0
MYALGIAS: 0
EYE REDNESS: 0
CHILLS: 0
SHORTNESS OF BREATH: 0
HEADACHES: 0
PALPITATIONS: 0
SINUS PAIN: 0
DIZZINESS: 0
ABDOMINAL PAIN: 1

## 2019-06-21 NOTE — OR NURSING
REPORT TO LISS KRAMER W/ 4 SMALL INCISIONS CLOSED W/ DERMABOND C/D/I.    HAS REC'D FENTANYL 50 MCGS X 2 IV, OXYCODONE 10 MG PO, DILAUDID 0.2 MG IV.  TOLERABLE AT THIS TIME. PT RESTING W/ EYES CLOSED, QUIET BREATHING. HR 60'S. /70 100 % 2L NC RR 17-22.

## 2019-06-21 NOTE — PROGRESS NOTES
2 RN skin check complete with Tonja  No devices in place.  No confirmed pressure ulcers found.  No new potential pressure ulcers noted. Some redness to Lt AC from adhesive tape removal.

## 2019-06-21 NOTE — CARE PLAN
Problem: Safety  Goal: Will remain free from falls  Outcome: PROGRESSING AS EXPECTED  No fall has occurred. Pt is SBA with a steady gait.     Problem: Venous Thromboembolism (VTW)/Deep Vein Thrombosis (DVT) Prevention:  Goal: Patient will participate in Venous Thrombosis (VTE)/Deep Vein Thrombosis (DVT)Prevention Measures  Outcome: PROGRESSING AS EXPECTED  No s/s of DVT. Pt is walking. Pt has SCDs on

## 2019-06-21 NOTE — ANESTHESIA TIME REPORT
Anesthesia Start and Stop Event Times     Date Time Event    6/20/2019 1811 Anesthesia Start     1904 Anesthesia Stop        Responsible Staff  06/20/19    Name Role Begin End    Philipp Polk M.D. Anesth 1811 1904        Preop Diagnosis (Free Text):  Pre-op Diagnosis     abdominal pain         Preop Diagnosis (Codes):  Diagnosis Information     Diagnosis Code(s):         Post op Diagnosis  Biliary dyskinesia      Premium Reason  A. 3PM - 7AM    Comments:

## 2019-06-21 NOTE — PROGRESS NOTES
Pt to room 324 bed 2 from ED this am. Rating pain moderately, has been NPO for hida scan. Pt down to surgery.

## 2019-06-21 NOTE — OP REPORT
Post OP Note    PreOp Diagnosis: Biliary dyskinesia versus a calculus cholecystitis    PostOp Diagnosis: Biliary dyskinesia    Procedure(s):  CHOLECYSTECTOMY, LAPAROSCOPIC - Wound Class: Clean Contaminated    Surgeon(s):  Otilio Nuno M.D.    Anesthesiologist/Type of Anesthesia:  Anesthesiologist: Philipp Polk M.D./General    Surgical Staff:  Circulator: Amalia Lundberg R.N.  Scrub Person: Erin Salgado; Noy Naranjo    Specimens removed if any:  ID Type Source Tests Collected by Time Destination   A :  Tissue Gallbladder PATHOLOGY SPECIMEN Otilio Nuno M.D. 6/20/2019  6:35 PM        Estimated Blood Loss: 20 cc    Findings: Mildly edematous gallbladder    Complications: No complications were noted    Indications: Patient is a morbidly obese 34-year-old female who presented with abdominal pain and a HIDA scan consistent with biliary dyskinesia.  She was transferred from a far facility.  We elected to perform her procedure urgently.  The patient was counseled extensively as to the risk first benefits of surgery and agreed to proceed fully informed.    OPERATIVE REPORT:     The patient was prepped and draped in the standard sterile surgical fashion after induction of general anesthesia.  Appropriate timeout had been performed and antibiotics delivered.  A verres insertion was performed in a periumbilical fashion.  The abdomen was insufflated to 15 mmHg of CO2.  A 5 mm Visiport was applied.  A subxiphoid 12 mm trocar was applied.  2 right upper quadrant 5 mm trochars were applied under direct visualization.    The gallbladder was located in its right upper quadrant position.  It was retracted superiorly and laterally.  The contents of Calot's triangle were dissected clearly free.  Once a critical view of safety was obtained, the cystic duct was triply clipped on the common duct side and singly clipped on the gallbladder side.  This was then transected.  The cystic artery was dealt with in a similar fashion.  The  thunder beat and electrocautery were used to transect the attachments of the gallbladder to the liver bed.  An Endo Catch was used to retrieve the gallbladder via the subxiphoid incision.    The right upper quadrant was copiously irrigated till clear.  The clips were in place without biliary spillage.  Hemostasis was achieved.  An Endo Close was used to close the subxiphoid incision.  Monocryl was used for skin edges.  Dermabond was applied as a dressing, the patient was extubated to recovery in satisfactory condition.    Disposition:  To the pacu for recovery.  The patient can be discharged in the morning if she is doing well.          6/20/2019 6:54 PM Otilio Nuno M.D.

## 2019-06-21 NOTE — ANESTHESIA QCDR
2019 [facility] Clinical Data Registry (for Quality Improvement)     Postoperative nausea/vomiting risk protocol (Adult = 18 yrs and Pediatric 3-17 yrs)- (430 and 463)  General inhalation anesthetic (NOT TIVA) with PONV risk factors: Yes  Provision of anti-emetic therapy with at least 2 different classes of agents: Yes   Patient DID NOT receive anti-emetic therapy and reason is documented in Medical Record:  N/A    Multimodal Pain Management- (AQI59)  Patient undergoing Elective Surgery (i.e. Outpatient, or ASC, or Prescheduled Surgery prior to Hospital Admission): No  Use of Multimodal Pain Management, two or more drugs and/or interventions, NOT including systemic opioids: N/A  Exception: Documented allergy to multiple classes of analgesics: N/A    PACU assessment of acute postoperative pain prior to Anesthesia Care End- Applies to Patients Age = 18- (ABG7)  Initial PACU pain score is which of the following: < 7/10  Patient unable to report pain score: N/A    Post-anesthetic transfer of care checklist/protocol to PACU/ICU- (426 and 427)  Upon conclusion of case, patient transferred to which of the following locations: PACU/Non-ICU  Use of transfer checklist/protocol: Yes  Exclusion: Service Performed in Patient Hospital Room (and thus did not require transfer): N/A    PACU Reintubation- (AQI31)  General anesthesia requiring endotracheal intubation (ETT) along with subsequent extubation in OR or PACU: Yes  Required reintubation in the PACU: No   Extubation was a planned trial documented in the medical record prior to removal of the original airway device:  N/A    Unplanned admission to ICU related to anesthesia service up through end of PACU care- (MD51)  Unplanned admission to ICU (not initially anticipated at anesthesia start time): No

## 2019-06-21 NOTE — ANESTHESIA PROCEDURE NOTES
Airway  Date/Time: 6/20/2019 6:18 PM  Performed by: TEDDY HE  Authorized by: TEDDY HE     Location:  OR  Urgency:  Elective  Indications for Airway Management:  Anesthesia  Spontaneous Ventilation: absent    Sedation Level:  Deep  Preoxygenated: Yes    Patient Position:  Sniffing  Final Airway Type:  Endotracheal airway  Final Endotracheal Airway:  ETT  Cuffed: Yes    Technique Used for Successful ETT Placement:  Direct laryngoscopy  Insertion Site:  Oral  Blade Type:  Rahul  Laryngoscope Blade/Videolaryngoscope Blade Size:  3  ETT Size (mm):  7.0  Measured from:  Teeth  ETT to Teeth (cm):  23  Placement Verified by: auscultation and capnometry    Cormack-Lehane Classification:  Grade IIa - partial view of glottis  Number of Attempts at Approach:  1

## 2019-06-21 NOTE — PROGRESS NOTES
[facility] Medicine Daily Progress Note    Date of Service  6/21/2019    Chief Complaint  34 y.o. female admitted 6/20/2019 with abdominal pain.     Hospital Course    This is a 35 y/o F with no significant medical problems, who presented to an outside facility with complaints of worsening abdominal pain. She was seen 2 days prior at the same facility with similar complaints in addition to gassiness.  She had a thorough work-up including an ultrasound and H. pylori testing both of which was negative.  She was given a prescription for a proton pump inhibitor however she was unable to fill. Pt was found to have elevated LFT. She was admitted for further work up. Pt had a HIDA scan done showing biliary dyskinesia. Surgery was consulted and pt had lap akua done on 6/20.          Interval Problem Update  Pt seen and examined, afebrile, had lap akua yesterday, still with some abdominal pain, also unable to tolerate food well this AM, still with some nausea.     Consultants/Specialty  Surgery     Code Status  Full Code     Disposition  Home when medically cleared     Review of Systems  Review of Systems   Constitutional: Negative for chills and fever.   HENT: Negative for ear pain and sinus pain.    Eyes: Negative for pain and redness.   Respiratory: Negative for cough and shortness of breath.    Cardiovascular: Negative for chest pain and palpitations.   Gastrointestinal: Positive for abdominal pain and nausea.   Genitourinary: Negative for dysuria and urgency.   Musculoskeletal: Negative for joint pain and myalgias.   Skin: Negative for rash.   Neurological: Negative for dizziness and headaches.        Physical Exam  Temp:  [35.8 °C (96.5 °F)-37.2 °C (98.9 °F)] 36.2 °C (97.1 °F)  Pulse:  [59-88] 71  Resp:  [15-22] 17  BP: (110-176)/(60-82) 117/70  SpO2:  [90 %-100 %] 95 %    Physical Exam   Constitutional: She is oriented to person, place, and time.   HENT:   Head: Normocephalic and atraumatic.   Eyes: Conjunctivae are  normal. No scleral icterus.   Neck: Neck supple. No JVD present.   Cardiovascular: Normal rate.  Exam reveals no gallop.    Pulmonary/Chest: She has no wheezes. She has no rales.   Abdominal: Soft. Bowel sounds are normal. She exhibits no distension. There is tenderness. There is no rebound and no guarding.   Neurological: She is alert and oriented to person, place, and time.   Skin: Skin is warm and dry. No erythema.   Nursing note and vitals reviewed.      Fluids    Intake/Output Summary (Last 24 hours) at 06/21/19 1226  Last data filed at 06/21/19 1000   Gross per 24 hour   Intake             2210 ml   Output               20 ml   Net             2190 ml       Laboratory  Recent Labs      06/20/19   0254   WBC  4.2*   RBC  3.90*   HEMOGLOBIN  11.5*   HEMATOCRIT  35.7*   MCV  91.5   MCH  29.5   MCHC  32.2*   RDW  44.1   PLATELETCT  236   MPV  11.1     Recent Labs      06/20/19   0254  06/21/19   0601   SODIUM  139  135   POTASSIUM  3.7  4.2   CHLORIDE  108  104   CO2  25  23   GLUCOSE  110*  156*   BUN  8  7*   CREATININE  0.89  0.68   CALCIUM  8.9  8.9                   Imaging  NM-BILIARY (HIDA) SCAN WITH CCK   Final Result         1. Abnormally low gallbladder's ejection fraction, could be seen with biliary dyskinesia.      2. No scintigraphic evidence of acute cholecystitis.      CT-ABDOMEN-PELVIS WITH   Final Result         1.  No acute abnormality.   2.  Low-density left hepatic lobe lesion, most commonly associated with cyst or hemangioma. Consider other hepatic lesions additional workup as clinically appropriate.   3.  Hepatomegaly      US-RUQ   Final Result         1.  Hepatomegaly and echogenic liver, compatible with fatty change and/or fibrosis.      OUTSIDE IMAGES-US ABDOMEN   Final Result      OUTSIDE IMAGES-DX ABDOMEN   Final Result           Assessment/Plan  * Melena   Assessment & Plan    hemoglobin stable, no more episodes here   Will monitor      Transaminitis   Assessment & Plan    probably due  to cholecystitis   Now trending down after lap akua.      AP (abdominal pain)   Assessment & Plan    Probably due from  Biliary dyskinesia versus a calculus cholecystitis  S/p lap akua on 6/20/19       Tobacco abuse   Assessment & Plan    Counseled on cessation           VTE prophylaxis: scd

## 2019-06-21 NOTE — PROGRESS NOTES
2 RN skin check complete.   Devices in place SCDs.  Skin assessed under devices yes.  Confirmed pressure ulcers found on NA.  New potential pressure ulcers noted on NA. Wound consult placed NA.  The following interventions in place NA  Pt has four lap sites on her abdomen. Besides that pts skin is intact.

## 2019-06-21 NOTE — ANESTHESIA POSTPROCEDURE EVALUATION
Patient: Jasmine Saldivar    Procedure Summary     Date:  06/20/19 Room / Location:  [place] [address] / SURGERY [facility]    Anesthesia Start:  1811 Anesthesia Stop:  1904    Procedure:  CHOLECYSTECTOMY, LAPAROSCOPIC (Abdomen) Diagnosis:  ( Biliary dyskinesia )    Surgeon:  Otilio Nuno M.D. Responsible Provider:  Philipp Polk M.D.    Anesthesia Type:  general ASA Status:  3          Final Anesthesia Type: general  Last vitals  BP   Blood Pressure: 110/60, NIBP: 116/74    Temp   37.2 °C (98.9 °F)    Pulse   Pulse: 88, Heart Rate (Monitored): (!) 57   Resp   19    SpO2   90 %      Anesthesia Post Evaluation    Patient location during evaluation: PACU  Patient participation: complete - patient participated  Level of consciousness: awake and alert    Airway patency: patent  Anesthetic complications: no  Cardiovascular status: hemodynamically stable  Respiratory status: acceptable  Hydration status: euvolemic    PONV: none           Nurse Pain Score: 7 (NPRS)

## 2019-06-21 NOTE — CONSULTS
"General Surgery Consult    CHIEF COMPLAINT: Abdominal pain.     HISTORY OF PRESENT ILLNESS: The patient is a 34 y.o. female, who presents with abdominal pain.  She was transferred from an outside facility due to fear of a GI bleed however this did not have merit.  She describes right upper quadrant abdominal pain.  She underwent a normal ultrasound and CT scan.  A follow-up HIDA scan showed a gallbladder ejection fraction of 8% consistent with biliary dyskinesia.  There is no evidence of cholecystitis.  The patient has a history of smoking and morbid obesity.     PAST MEDICAL HISTORY:   Tobacco abuse.  Morbid obesity.    PAST SURGICAL HISTORY:  has a past surgical history that includes tubal ligation.     ALLERGIES: No Known Allergies     CURRENT MEDICATIONS:   Home Medications    **Home medications have not yet been reviewed for this encounter**         FAMILY HISTORY: History reviewed. No pertinent family history.     SOCIAL HISTORY:   Social History     Social History Main Topics   • Smoking status: Current Every Day Smoker     Packs/day: 0.50     Types: Cigarettes   • Smokeless tobacco: Never Used   • Alcohol use Yes      Comment: rarely   • Drug use: No   • Sexual activity: Not on file       REVIEW OF SYSTEMS: Comprehensive review of systems was negative aside from abdominal pain is described in the HPI.    PHYSICAL EXAMINATION:     GENERAL: The patient is in no acute distress.   VITAL SIGNS: BP (!) 176/74   Pulse 65   Temp 35.9 °C (96.7 °F) (Temporal)   Resp 17   Ht 1.651 m (5' 5\")   Wt (!) 132.5 kg (292 lb)   SpO2 98%   HEAD AND NECK: Demonstrates symmetric, reactive pupils. Extraocular muscles   are intact. Nares and oropharynx are clear.   NECK: Supple. No adenopathy.  CHEST:No respiratory distress.    CARDIOVASCULAR: Regular rate. The extremities are well perfused.   ABDOMEN: Morbidly obese abdomen.  Tender to palpation in the midepigastrium.  Scarring consistent with surgical history.   EXTREMITIES: " Examination of the upper and lower extremities demonstrates no cyanosis edema or clubbing.  NEUROLOGIC: Alert & oriented x 3, Normal motor function, Normal sensory function, No focal deficits noted.    LABORATORY VALUES:   Recent Labs      06/20/19   0254   WBC  4.2*   RBC  3.90*   HEMOGLOBIN  11.5*   HEMATOCRIT  35.7*   MCV  91.5   MCH  29.5   MCHC  32.2*   RDW  44.1   PLATELETCT  236   MPV  11.1     Recent Labs      06/20/19   0254   SODIUM  139   POTASSIUM  3.7   CHLORIDE  108   CO2  25   GLUCOSE  110*   BUN  8   CREATININE  0.89   CALCIUM  8.9     Recent Labs      06/20/19   0254   ASTSGOT  195*   ALTSGPT  146*   TBILIRUBIN  0.4   ALKPHOSPHAT  111*   GLOBULIN  2.8            IMAGING:   NM-BILIARY (HIDA) SCAN WITH CCK   Final Result         1. Abnormally low gallbladder's ejection fraction, could be seen with biliary dyskinesia.      2. No scintigraphic evidence of acute cholecystitis.      CT-ABDOMEN-PELVIS WITH   Final Result         1.  No acute abnormality.   2.  Low-density left hepatic lobe lesion, most commonly associated with cyst or hemangioma. Consider other hepatic lesions additional workup as clinically appropriate.   3.  Hepatomegaly      US-RUQ   Final Result         1.  Hepatomegaly and echogenic liver, compatible with fatty change and/or fibrosis.      OUTSIDE IMAGES-US ABDOMEN   Final Result      OUTSIDE IMAGES-DX ABDOMEN   Final Result          IMPRESSION AND PLAN:     1.  Biliary dyskinesia versus a calculus cholecystitis.    1.  The patient will be taken to the operating room for a laparoscopic cholecystectomy. The surgical conduct was explained. Potential complications including but not limited to infection, bleeding, damage to adjacent structures, anesthetic complications were discussed in detail. Questions were elicited and answered to her satisfaction. She understands the rationale for surgery and elects to proceed.  Operative consent signed.    2.  I elected to do this on an urgent basis  and she is already an inpatient and has been transferred.      ___________________________________   Otilio Nuno M.D.    DD: 6/20/2019 DT: 5:55 PM

## 2019-06-21 NOTE — ANESTHESIA PREPROCEDURE EVALUATION
Relevant Problems   No relevant active problems       Physical Exam    Airway   Mallampati: II  TM distance: >3 FB  Neck ROM: full       Cardiovascular - normal exam  Rhythm: regular  Rate: normal  (-) murmur     Dental - normal exam         Pulmonary - normal exam  Breath sounds clear to auscultation     Abdominal    Neurological - normal exam                 Anesthesia Plan    ASA 3   ASA physical status 3 criteria: morbid obesity - BMI greater than or equal to 40    Plan - general             Induction: intravenous    Postoperative Plan: Postoperative administration of opioids is intended.    Pertinent diagnostic labs and testing reviewed    Informed Consent:    Anesthetic plan and risks discussed with patient.    Use of blood products discussed with: patient whom consented to blood products.

## 2019-06-22 ENCOUNTER — PATIENT OUTREACH (OUTPATIENT)
Dept: HEALTH INFORMATION MANAGEMENT | Facility: OTHER | Age: 34
End: 2019-06-22

## 2019-06-22 VITALS
OXYGEN SATURATION: 94 % | RESPIRATION RATE: 16 BRPM | HEIGHT: 65 IN | TEMPERATURE: 97.4 F | WEIGHT: 292 LBS | BODY MASS INDEX: 48.65 KG/M2 | DIASTOLIC BLOOD PRESSURE: 55 MMHG | SYSTOLIC BLOOD PRESSURE: 107 MMHG | HEART RATE: 66 BPM

## 2019-06-22 LAB
CMV DNA # SERPL NAA+PROBE: <390 CPY/ML
CMV DNA SERPL NAA+PROBE-ACNC: <227 IU/ML
CMV DNA SERPL NAA+PROBE-LOG IU: <2.4 LOG IU/ML
CMV DNA SERPL NAA+PROBE-LOG#: <2.6 LOG CPY/ML
CMV GENE MUT DET ISLT: NOT DETECTED
CMV PCR SOURCE Q4398: NORMAL
HSV DNA SPEC QL NAA+PROBE: NOT DETECTED
SPECIMEN SOURCE: NORMAL

## 2019-06-22 PROCEDURE — 700102 HCHG RX REV CODE 250 W/ 637 OVERRIDE(OP): Performed by: HOSPITALIST

## 2019-06-22 PROCEDURE — 99239 HOSP IP/OBS DSCHRG MGMT >30: CPT | Performed by: INTERNAL MEDICINE

## 2019-06-22 PROCEDURE — G0378 HOSPITAL OBSERVATION PER HR: HCPCS

## 2019-06-22 PROCEDURE — A9270 NON-COVERED ITEM OR SERVICE: HCPCS | Performed by: HOSPITALIST

## 2019-06-22 RX ORDER — OMEPRAZOLE 20 MG/1
20 CAPSULE, DELAYED RELEASE ORAL 2 TIMES DAILY
Qty: 30 CAP | Refills: 0 | Status: SHIPPED | OUTPATIENT
Start: 2019-06-22

## 2019-06-22 RX ORDER — ONDANSETRON 4 MG/1
4 TABLET, ORALLY DISINTEGRATING ORAL EVERY 4 HOURS PRN
Qty: 10 TAB | Refills: 0 | Status: SHIPPED | OUTPATIENT
Start: 2019-06-22

## 2019-06-22 RX ORDER — AMOXICILLIN 250 MG
2 CAPSULE ORAL 2 TIMES DAILY
Qty: 30 TAB | Refills: 0 | Status: SHIPPED | OUTPATIENT
Start: 2019-06-22

## 2019-06-22 RX ORDER — OXYCODONE HYDROCHLORIDE 5 MG/1
5 TABLET ORAL
Qty: 15 TAB | Refills: 0 | Status: SHIPPED | OUTPATIENT
Start: 2019-06-22 | End: 2019-06-27

## 2019-06-22 RX ADMIN — OMEPRAZOLE 20 MG: 20 CAPSULE, DELAYED RELEASE ORAL at 06:01

## 2019-06-22 RX ADMIN — SENNOSIDES, DOCUSATE SODIUM 2 TABLET: 50; 8.6 TABLET, FILM COATED ORAL at 06:00

## 2019-06-22 NOTE — CARE PLAN
Problem: Bowel/Gastric:  Goal: Normal bowel function is maintained or improved  Outcome: PROGRESSING AS EXPECTED    Goal: Will not experience complications related to bowel motility  Outcome: PROGRESSING AS EXPECTED      Problem: Knowledge Deficit  Goal: Knowledge of disease process/condition, treatment plan, diagnostic tests, and medications will improve  Outcome: PROGRESSING AS EXPECTED

## 2019-06-22 NOTE — DISCHARGE SUMMARY
Discharge Summary    CHIEF COMPLAINT ON ADMISSION  Chief Complaint   Patient presents with   • N/V   • Sent by MD     transfer from [place] for GI bleed for GI consult       Reason for Admission  EMS Transfer     Admission Date  6/20/2019    CODE STATUS  Full Code    HPI & HOSPITAL COURSE  This is a 33 y/o F with no significant medical problems, who presented to an outside facility with complaints of worsening abdominal pain. She was seen 2 days prior at the same facility with similar complaints in addition to gassiness.  She had a thorough work-up including an ultrasound and H. pylori testing both of which was negative.  She was given a prescription for a proton pump inhibitor however she was unable to fill. Pt was found to have elevated LFT. She was admitted for further work up. Pt had a HIDA scan done showing biliary dyskinesia. Surgery was consulted and pt had lap akua done on 6/20.    Her pain has significantly improved after the surgery.  She has been able to tolerate diet.  She states that she is ready to be discharged home and follow-up with her doctor back in [place].  In addition when he was transferred here he was having black-colored stool/bright red blood colored stool.  She has not had a bowel movement for the past few days since he got here.  According to her she has history of chronic hemorrhoids since she was really young.  But she was not able to see any gastroenterologist for that issue.  I offered her that I can consult gastroenterologist to come see her here but she insisted that she does not want to stay in the hospital for 1 more day and she will see her doctor when she is back to [place] after discharge.  I explained to her that the risks of not evaluating all managing her GI bleeding  including death.  She understood the risks but she insisted that she be discharged home today.  He has a capacity to make her own medical decision.  I instructed her that she go to nearest ER if she  experiences GI bleed again.  I saw and examined her today.         Therefore, she is discharged in guarded and stable condition to home with close outpatient follow-up.    The patient met 2-midnight criteria for an inpatient stay at the time of discharge.    Discharge Date  6/22/19    FOLLOW UP ITEMS POST DISCHARGE      DISCHARGE DIAGNOSES  Principal Problem:    Melena POA: Unknown  Active Problems:    Transaminitis POA: Unknown    Tobacco abuse POA: Unknown    AP (abdominal pain) POA: Unknown  Resolved Problems:    * No resolved hospital problems. *      FOLLOW UP  No future appointments.  PCP in 1 week          Otilio Nuno M.D.  [address] [facility] [address]  [facility] [address]  [phone]    In 1 week        MEDICATIONS ON DISCHARGE     Medication List      START taking these medications      Instructions   omeprazole 20 MG delayed-release capsule  Commonly known as:  PRILOSEC   Take 1 Cap by mouth 2 Times a Day.  Dose:  20 mg     ondansetron 4 MG Tbdp  Commonly known as:  ZOFRAN ODT   Take 1 Tab by mouth every four hours as needed for Nausea (give PO if no IV route available).  Dose:  4 mg     oxyCODONE immediate-release 5 MG Tabs  Commonly known as:  ROXICODONE   Take 1 Tab by mouth every 3 hours as needed for up to 5 days.  Dose:  5 mg     senna-docusate 8.6-50 MG Tabs  Commonly known as:  PERICOLACE or SENOKOT S   Take 2 Tabs by mouth 2 Times a Day.  Dose:  2 Tab        CONTINUE taking these medications      Instructions   gabapentin 100 MG Caps  Commonly known as:  NEURONTIN   Take 200 mg by mouth 3 times a day.  Dose:  200 mg            Allergies  No Known Allergies    DIET  Orders Placed This Encounter   Procedures   • Diet Order Regular     Standing Status:   Standing     Number of Occurrences:   1     Order Specific Question:   Diet:     Answer:   Regular [1]       ACTIVITY  As tolerated.  Weight bearing as tolerated    CONSULTATIONS  Dr. Nurys fatima    LABORATORY  Lab Results    Component Value Date    SODIUM 135 06/21/2019    POTASSIUM 4.2 06/21/2019    CHLORIDE 104 06/21/2019    CO2 23 06/21/2019    GLUCOSE 156 (H) 06/21/2019    BUN 7 (L) 06/21/2019    CREATININE 0.68 06/21/2019        Lab Results   Component Value Date    WBC 4.2 (L) 06/20/2019    HEMOGLOBIN 11.5 (L) 06/20/2019    HEMATOCRIT 35.7 (L) 06/20/2019    PLATELETCT 236 06/20/2019        Total time of the discharge process exceeds 39 minutes.

## 2019-06-22 NOTE — DISCHARGE INSTRUCTIONS
Discharge Instructions    Discharged to home by car with relative. Discharged via wheelchair, hospital escort: Yes.  Special equipment needed: Not Applicable    Be sure to schedule a follow-up appointment with your primary care doctor or any specialists as instructed.     Discharge Plan:   Pneumococcal Vaccine Administered/Refused: Not given - Patient refused pneumococcal vaccine  Influenza Vaccine Indication: Indicated: Not available from distributor/    I understand that a diet low in cholesterol, fat, and sodium is recommended for good health. Unless I have been given specific instructions below for another diet, I accept this instruction as my diet prescription.   Other diet: regular     Special Instructions: None    · Is patient discharged on Warfarin / Coumadin?   No     Depression / Suicide Risk    As you are discharged from this [facility] facility, it is important to learn how to keep safe from harming yourself.    Recognize the warning signs:  · Abrupt changes in personality, positive or negative- including increase in energy   · Giving away possessions  · Change in eating patterns- significant weight changes-  positive or negative  · Change in sleeping patterns- unable to sleep or sleeping all the time   · Unwillingness or inability to communicate  · Depression  · Unusual sadness, discouragement and loneliness  · Talk of wanting to die  · Neglect of personal appearance   · Rebelliousness- reckless behavior  · Withdrawal from people/activities they love  · Confusion- inability to concentrate     If you or a loved one observes any of these behaviors or has concerns about self-harm, here's what you can do:  · Talk about it- your feelings and reasons for harming yourself  · Remove any means that you might use to hurt yourself (examples: pills, rope, extension cords, firearm)  · Get professional help from the community (Mental Health, Substance Abuse, psychological counseling)  · Do not be  alone:Call your Safe Contact- someone whom you trust who will be there for you.  · Call your local CRISIS HOTLINE 372-9919 or 570-279-3034  · Call your local Children's Mobile Crisis Response Team [place] (441) 640-6629 or www.[employer]  · Call the toll free National Suicide Prevention Hotlines   · National Suicide Prevention Lifeline 745-509-BCFN (1678)  · National mSnap Line Network 800-SUICIDE (175-0261)

## (undated) DEVICE — TUBE CONNECT SUCTION CLEAR 120 X 1/4" (50EA/CA)"

## (undated) DEVICE — HEAD HOLDER JUNIOR/ADULT

## (undated) DEVICE — GLOVE BIOGEL SZ 6.5 SURGICAL PF LTX (50PR/BX 4BX/CA)

## (undated) DEVICE — KIT ROOM DECONTAMINATION

## (undated) DEVICE — DETERGENT RENUZYME PLUS 10 OZ PACKET (50/BX)

## (undated) DEVICE — DRAPESURG STERI-DRAPE LONG - (10/BX 4BX/CA)

## (undated) DEVICE — SUTURE 4-0 MONOCRYL PLUS PS-2 - 27 INCH (36/BX)

## (undated) DEVICE — TUBE E-T HI-LO CUFF 7.0MM (10EA/PK)

## (undated) DEVICE — SUCTION INSTRUMENT YANKAUER BULBOUS TIP W/O VENT (50EA/CA)

## (undated) DEVICE — CLIP MED LG INTNL HRZN TI ESCP - (20/BX)

## (undated) DEVICE — MASK ANESTHESIA ADULT  - (100/CA)

## (undated) DEVICE — NEPTUNE 4 PORT MANIFOLD - (20/PK)

## (undated) DEVICE — HANDPIECE THUNDERBEAT 5MM 35CM FRONT GRIP TYPE S

## (undated) DEVICE — KIT ANESTHESIA W/CIRCUIT & 3/LT BAG W/FILTER (20EA/CA)

## (undated) DEVICE — PROTECTOR ULNA NERVE - (36PR/CA)

## (undated) DEVICE — TROCAR 12 X 150 KII FIOS Z THREAD (6EA/BX)

## (undated) DEVICE — TROCAR FIOS OPTICAL ACCESS SYSTEM 5 X 150MM (6/BX)

## (undated) DEVICE — ELECTRODE 850 FOAM ADHESIVE - HYDROGEL RADIOTRNSPRNT (50/PK)

## (undated) DEVICE — SUTURE GENERAL

## (undated) DEVICE — SENSOR SPO2 NEO LNCS ADHESIVE (20/BX) SEE USER NOTES

## (undated) DEVICE — DERMABOND ADVANCED - (12EA/BX)

## (undated) DEVICE — ELECTRODE DUAL RETURN W/ CORD - (50/PK)

## (undated) DEVICE — TUBING INSUFFLATION - (10/BX)

## (undated) DEVICE — SET EXTENSION WITH 2 PORTS (48EA/CA) ***PART #2C8610 IS A SUBSTITUTE*****

## (undated) DEVICE — SUTURE 0 VICRYL PLUS UR-6 - 27 INCH (36/BX)

## (undated) DEVICE — GLOVE SZ 6 BIOGEL PI MICRO - PF LF (50PR/BX 4BX/CA)

## (undated) DEVICE — CHLORAPREP 26 ML APPLICATOR - ORANGE TINT(25/CA)

## (undated) DEVICE — SODIUM CHL IRRIGATION 0.9% 1000ML (12EA/CA)

## (undated) DEVICE — GLOVE BIOGEL PI INDICATOR SZ 6.5 SURGICAL PF LF - (50/BX 4BX/CA)

## (undated) DEVICE — SET SUCTION/IRRIGATION WITH DISPOSABLE TIP (6/CA )PART #0250-070-520 IS A SUB

## (undated) DEVICE — GOWN WARMING STANDARD FLEX - (30/CA)

## (undated) DEVICE — TUBING CLEARLINK DUO-VENT - C-FLO (48EA/CA)

## (undated) DEVICE — PACK LAP CHOLE OR - (2EA/CA)

## (undated) DEVICE — LACTATED RINGERS INJ 1000 ML - (14EA/CA 60CA/PF)

## (undated) DEVICE — CANISTER SUCTION 3000ML MECHANICAL FILTER AUTO SHUTOFF MEDI-VAC NONSTERILE LF DISP  (40EA/CA)

## (undated) DEVICE — SET LEADWIRE 5 LEAD BEDSIDE DISPOSABLE ECG (1SET OF 5/EA)

## (undated) DEVICE — GLOVE BIOGEL SZ 8 SURGICAL PF LTX - (50PR/BX 4BX/CA)